# Patient Record
Sex: MALE | Race: ASIAN
[De-identification: names, ages, dates, MRNs, and addresses within clinical notes are randomized per-mention and may not be internally consistent; named-entity substitution may affect disease eponyms.]

---

## 2021-12-02 PROBLEM — Z00.00 ENCOUNTER FOR PREVENTIVE HEALTH EXAMINATION: Status: ACTIVE | Noted: 2021-12-02

## 2021-12-03 ENCOUNTER — APPOINTMENT (OUTPATIENT)
Dept: UROLOGY | Facility: CLINIC | Age: 41
End: 2021-12-03
Payer: COMMERCIAL

## 2021-12-03 VITALS — TEMPERATURE: 98 F | BODY MASS INDEX: 25.2 KG/M2 | HEIGHT: 71 IN | WEIGHT: 180 LBS

## 2021-12-03 DIAGNOSIS — N47.8 OTHER DISORDERS OF PREPUCE: ICD-10-CM

## 2021-12-03 PROCEDURE — 99203 OFFICE O/P NEW LOW 30 MIN: CPT

## 2021-12-03 NOTE — HISTORY OF PRESENT ILLNESS
[FreeTextEntry1] : This is a 41 year male who has redundant foreskin\par states that no problem with sex or urination \par but wondering if circumcision is necessary\par states that skin never pulls back when he is erect\par but he is able to pull it back when he is flacid

## 2022-05-14 ENCOUNTER — NON-APPOINTMENT (OUTPATIENT)
Age: 42
End: 2022-05-14

## 2022-06-07 ENCOUNTER — APPOINTMENT (OUTPATIENT)
Dept: UROLOGY | Facility: CLINIC | Age: 42
End: 2022-06-07